# Patient Record
Sex: FEMALE
[De-identification: names, ages, dates, MRNs, and addresses within clinical notes are randomized per-mention and may not be internally consistent; named-entity substitution may affect disease eponyms.]

---

## 2024-04-17 ENCOUNTER — TELEPHONE (OUTPATIENT)
Facility: HOSPITAL | Age: 54
End: 2024-04-17

## 2024-04-24 ENCOUNTER — HOSPITAL ENCOUNTER (OUTPATIENT)
Facility: HOSPITAL | Age: 54
Setting detail: RECURRING SERIES
Discharge: HOME | End: 2024-04-27
Payer: MEDICAID

## 2024-04-24 PROCEDURE — 97162 PT EVAL MOD COMPLEX 30 MIN: CPT

## 2024-04-24 PROCEDURE — 97163 PT EVAL HIGH COMPLEX 45 MIN: CPT

## 2024-04-24 NOTE — PROGRESS NOTES
PT DAILY TREATMENT NOTE/LUMBAR EVAL     Patient Name: Janeth Vargas    Date: 2024    : 1970  Insurance: Payor: [facility] MEDICAID / Plan: [employer] PLAN([facility]) / Product Type: *No Product type* /      Patient  verified yes     Visit #   Current / Total 1 16   Time   In / Out 10:01am 10:39am   Pain   In / Out Knee and back = 8/10  No change   Subjective Functional Status/Changes: See below       Treatment Area: Other low back pain [M54.59]  SUBJECTIVE  Pain Level (0-10 scale): see above  []constant []intermittent []improving []worsening []no change since onset    Any medication changes, allergies to medications, adverse drug reactions, diagnosis change, or new procedure performed?: [x] No    [] Yes (see summary sheet for update)  Subjective functional status/changes:     PLOF: sedentary lifestyle, Quad Cane(QC), functionally semi-dependent with  AD in the home (sister is her primary caregiver)  PMHx/Surgical Hx: RA, OA, T2DM (medication), HTN (medication), 2016 = intenstine surgery, section  x4 pregnancy, obese       Chief Complaint:OA in lumbar  and knees       Mechanism of injury:3 weeks  random onset   ED visit on 3/22/23 states = arrived with low back pain x 6 days. Alleviating factors are laying down, exacerbating factors are sitting up and movement. Pain is described as ache primarily on R side of back that radiates into hips.   Symptoms:  Aggravated by:   [x] Bending [x] Sitting (short time) [x] Standing (short time) [x] Walking (short time)   [] Moving [] Cough [] Sneeze [] Valsalva   [] AM  [] PM  Lying:  [] sup   [] pro   [x] sidelying   [x] Other: STS,      Use of AD at home - grab bars, shower chair      Past History/Treatments:     Diagnostic Tests: x-ray  = OA in lumbar, no MRI      Work Hx: non specific on employment   Living Situation: did not specificy; sister came with her and was reported to be her primary caregiver   Pt Goals: improve pain   Barriers: 
in lumbar to bilateral hips. Pt states she has amb with a QC due to elevated knee pain for over a year and uses AD throughout her home like grab bars on her bed and shower chair due to elevated pain levels. Supine<>sit transfer: independent with UE support with Sit to supine, supine to sit required ModA for roll/transfer to sit - reported lumbar/ bilateral knee pain with both tranfers. Pt states being uncomfortable supine/hooklying on treatment table and declined lumbar/hip special tests. Observed to have limitations in hip and knee flexibility. Observed flexed posture with increase in lumbar lordosis. Pt demo limitation in LTR and weakness in glut/HS strength via bridge. She was outside age related norms for 5x STS assessment. Will address lumbar special tests in future treatment sessions. The pt demo a mechanics [id number] dysfunction in addition to a mechanical bilateral knee dysfunction. She demo deficits of gait, functional UE/LE strength, generalized flexibility, posture, and pain that impacts functional activity tolerance.     Patient would benefit from skilled PT services to modify and progress therapeutic interventions, analyze and address functional mobility deficits, analyze and address ROM deficits, analyze and address strength deficits, analyze and address soft tissue restrictions, analyze and cue for proper movement patterns, analyze and modify for postural abnormalities, and analyze and address imbalance/dizziness to address functional deficits and attain remaining goals.     Evaluation Complexity HistoryHIGH Complexity :3+ comorbidities / personal factors will impact the outcome/ POC  ; Examination HIGH Complexity : 4+ Standardized tests and measures addressing body structure, function, activity limitation and / or participation in recreation  ;Presentation MEDIUM Complexity : Evolving with changing characteristics  ;Clinical Decision Making MEDIUM Complexity : FOTO score of 26-74 FOTO

## 2024-04-25 ENCOUNTER — TELEPHONE (OUTPATIENT)
Facility: HOSPITAL | Age: 54
End: 2024-04-25

## 2024-04-25 NOTE — TELEPHONE ENCOUNTER
Called and spoke with pt. to schedule follow up appts. She plans to call back next week to schedule them.